# Patient Record
(demographics unavailable — no encounter records)

---

## 2017-02-14 RX ORDER — SIMVASTATIN 20 MG/1
20 TABLET, FILM COATED ORAL NIGHTLY
Qty: 90 TABLET | Refills: 3 | Status: CANCELLED | OUTPATIENT
Start: 2017-02-14

## 2017-02-21 NOTE — TELEPHONE ENCOUNTER
He has refills of his simvastin at [facility] in [place]   [facility] in [place] is requesting refill./